# Patient Record
(demographics unavailable — no encounter records)

---

## 2025-06-24 NOTE — CONSULT LETTER
[Dear  ___] : Dear  [unfilled], [Consult Letter:] : I had the pleasure of evaluating your patient, [unfilled]. [Please see my note below.] : Please see my note below. [Consult Closing:] : Thank you very much for allowing me to participate in the care of this patient.  If you have any questions, please do not hesitate to contact me. [Sincerely,] : Sincerely, [FreeTextEntry2] : Beltran Bunn [FreeTextEntry3] : John Carreon MD FACS Chief Colon and Rectal Surgery [facility]

## 2025-06-24 NOTE — PHYSICAL EXAM
[Normal Breath Sounds] : Normal breath sounds [Normal Heart Sounds] : normal heart sounds [Normal Rate and Rhythm] : normal rate and rhythm [No Rash or Lesion] : No rash or lesion [Alert] : alert [Oriented to Person] : oriented to person [Oriented to Place] : oriented to place [Oriented to Time] : oriented to time [Calm] : calm [Abdomen Masses] : No abdominal masses [Abdomen Tenderness] : ~T No ~M abdominal tenderness [JVD] : no jugular venous distention  [de-identified] : NAD [de-identified] : NC/AT [de-identified] : PAM, steady gait [FreeTextEntry1] : External anal exam-right posterior external fistula opening probed with a a fistula probe and tracks towards the anal canal Digital rectal exam increased tone patient is tense Anoscopy-procedure performed in standard fashion under direct vision with a standard adult anoscope.  Patient tolerated without event or complication. - No proctitis

## 2025-06-24 NOTE — ASSESSMENT
[FreeTextEntry1] : Anal fistula - I had a long discussion with the patient regarding anal fistula disease and treatment options - I recommended patient undergo examination under anesthesia with possible fistulotomy possible seton placement - We discussed risks and benefits of the procedure at length including bleeding, infection, risk of injury to the sphincter complex, fecal leakage and recurrence.  We also reviewed the possibility of two-stage procedure - Patient wishes to proceed and will schedule at his earliest convenience - All questions answered

## 2025-06-24 NOTE — HISTORY OF PRESENT ILLNESS
[FreeTextEntry1] : 39 y/o male with history of a perianal fistula presents today for initial consultation.  He complains of several episodes of bloody drainage which was occurring when he sits on the toilet.  Initially he thought it was hemorrhoids and saw a gastroenterologist for evaluation, but the hemorrhoids were fine.  He was sent for MR Pelvis which revealed a tiny intersphinctic perianal fistula arising at 6:00 location focally and extending 203 mm.  there is a more prominent transsphincteric perianal fistula present 1.7 cm more distally, arising at 6:00 location an extending inferiorly over a length of approximately 1.2 cm.